# Patient Record
Sex: FEMALE | Race: WHITE | Employment: FULL TIME | URBAN - NONMETROPOLITAN AREA
[De-identification: names, ages, dates, MRNs, and addresses within clinical notes are randomized per-mention and may not be internally consistent; named-entity substitution may affect disease eponyms.]

---

## 2018-02-05 ENCOUNTER — HOSPITAL ENCOUNTER (OUTPATIENT)
Dept: GENERAL RADIOLOGY | Age: 33
Discharge: HOME | End: 2018-02-07
Payer: COMMERCIAL

## 2018-02-05 ENCOUNTER — OFFICE VISIT (OUTPATIENT)
Dept: PRIMARY CARE CLINIC | Age: 33
End: 2018-02-05
Payer: COMMERCIAL

## 2018-02-05 VITALS
TEMPERATURE: 97.6 F | WEIGHT: 293 LBS | DIASTOLIC BLOOD PRESSURE: 78 MMHG | RESPIRATION RATE: 16 BRPM | BODY MASS INDEX: 45.99 KG/M2 | SYSTOLIC BLOOD PRESSURE: 126 MMHG | OXYGEN SATURATION: 98 % | HEART RATE: 70 BPM | HEIGHT: 67 IN

## 2018-02-05 DIAGNOSIS — S39.92XA INJURY OF COCCYX, INITIAL ENCOUNTER: ICD-10-CM

## 2018-02-05 DIAGNOSIS — S32.10XA CLOSED FRACTURE OF SACRUM AND COCCYX, INITIAL ENCOUNTER (HCC): Primary | ICD-10-CM

## 2018-02-05 DIAGNOSIS — S32.2XXA CLOSED FRACTURE OF SACRUM AND COCCYX, INITIAL ENCOUNTER (HCC): Primary | ICD-10-CM

## 2018-02-05 DIAGNOSIS — M53.3 COCCYX PAIN: ICD-10-CM

## 2018-02-05 PROCEDURE — 99202 OFFICE O/P NEW SF 15 MIN: CPT | Performed by: NURSE PRACTITIONER

## 2018-02-05 PROCEDURE — 72220 X-RAY EXAM SACRUM TAILBONE: CPT

## 2018-02-05 RX ORDER — HYDROCODONE BITARTRATE AND ACETAMINOPHEN 5; 325 MG/1; MG/1
TABLET ORAL
Qty: 30 TABLET | Refills: 0 | Status: SHIPPED | OUTPATIENT
Start: 2018-02-05 | End: 2018-02-12

## 2018-02-05 RX ORDER — CYCLOBENZAPRINE HCL 10 MG
10 TABLET ORAL 3 TIMES DAILY PRN
Qty: 30 TABLET | Refills: 0 | Status: SHIPPED | OUTPATIENT
Start: 2018-02-05 | End: 2018-02-15

## 2018-02-05 ASSESSMENT — ENCOUNTER SYMPTOMS
BACK PAIN: 1
BOWEL INCONTINENCE: 0
RESPIRATORY NEGATIVE: 1

## 2018-02-05 NOTE — LETTER
[facility]  [address] [address]  Phone: [phone]  Fax: [address] [facility], [facility]      February 5, 2018    Patient:   Fracisco Ruiz  Date of Birth   1985  Date of visit   2/5/2018        To Whom it May Concern:      Neto Pereira was seen in my clinic on 2/5/2018. Please excuse from work 02/05/18. If you have any questions or concerns, please don't hesitate to call.       Sincerely,      KARINA Padron

## 2018-02-09 ENCOUNTER — OFFICE VISIT (OUTPATIENT)
Dept: ORTHOPEDIC SURGERY | Age: 33
End: 2018-02-09
Payer: COMMERCIAL

## 2018-02-09 VITALS
SYSTOLIC BLOOD PRESSURE: 128 MMHG | BODY MASS INDEX: 45.99 KG/M2 | WEIGHT: 293 LBS | HEIGHT: 67 IN | HEART RATE: 85 BPM | DIASTOLIC BLOOD PRESSURE: 84 MMHG

## 2018-02-09 DIAGNOSIS — M53.3 COCCYDYNIA: ICD-10-CM

## 2018-02-09 DIAGNOSIS — M53.3 COCCYX PAIN: Primary | ICD-10-CM

## 2018-02-09 PROCEDURE — 99203 OFFICE O/P NEW LOW 30 MIN: CPT | Performed by: ORTHOPAEDIC SURGERY

## 2018-02-13 PROBLEM — M53.3 COCCYDYNIA: Status: ACTIVE | Noted: 2018-02-13

## 2018-02-13 ASSESSMENT — ENCOUNTER SYMPTOMS: BACK PAIN: 1

## 2018-02-13 NOTE — PROGRESS NOTES
Subjective:      Patient ID: Breanna Rodarte is a 27 y.o. female. Back Pain   This is a new problem. The current episode started 1 to 4 weeks ago. The problem occurs constantly. The problem is unchanged. The pain is present in the gluteal. The quality of the pain is described as aching. The pain does not radiate. The pain is moderate. The symptoms are aggravated by sitting. Stiffness is present all day. The treatment provided no relief. Patient with recent fall directly onto coccyx. Patient with coccydynia since that time    Difficulty sitting on a hard surface    Review of Systems   Musculoskeletal: Positive for back pain. All other systems reviewed and are negative. Objective:   Physical Exam   Constitutional: She is oriented to person, place, and time. She appears well-developed and well-nourished. HENT:   Head: Normocephalic and atraumatic. Eyes: Conjunctivae and EOM are normal.   Neck: Normal range of motion. Pulmonary/Chest: Effort normal. No respiratory distress. Neurological: She is alert and oriented to person, place, and time. She has normal strength. No sensory deficit. Normal gait   Skin: Skin is warm and dry. Psychiatric: Her behavior is normal. Thought content normal.   Nursing note and vitals reviewed. Patient is noted to be sitting sitting on one but she. Diagnostic x-rays reveal what appears to be at displaced coccyx fragment  Assessment:      Encounter Diagnoses   Name Primary?     Coccyx pain Yes    Coccydynia            Plan:      Supportive care    Follow-up 4-6 weeks

## 2018-02-16 DIAGNOSIS — S32.10XD CLOSED FRACTURE OF SACRUM AND COCCYX WITH ROUTINE HEALING, SUBSEQUENT ENCOUNTER: Primary | ICD-10-CM

## 2018-02-16 DIAGNOSIS — S32.2XXD CLOSED FRACTURE OF SACRUM AND COCCYX WITH ROUTINE HEALING, SUBSEQUENT ENCOUNTER: Primary | ICD-10-CM

## 2018-02-16 RX ORDER — HYDROCODONE BITARTRATE AND ACETAMINOPHEN 5; 325 MG/1; MG/1
1-2 TABLET ORAL EVERY 4 HOURS PRN
Qty: 40 TABLET | Refills: 0 | Status: SHIPPED | OUTPATIENT
Start: 2018-02-16 | End: 2018-02-23